# Patient Record
Sex: FEMALE | Race: OTHER
[De-identification: names, ages, dates, MRNs, and addresses within clinical notes are randomized per-mention and may not be internally consistent; named-entity substitution may affect disease eponyms.]

---

## 2020-10-29 ENCOUNTER — TRANSCRIPTION ENCOUNTER (OUTPATIENT)
Age: 50
End: 2020-10-29

## 2022-07-05 PROBLEM — Z00.00 ENCOUNTER FOR PREVENTIVE HEALTH EXAMINATION: Status: ACTIVE | Noted: 2022-07-05

## 2022-07-15 ENCOUNTER — APPOINTMENT (OUTPATIENT)
Dept: ORTHOPEDIC SURGERY | Facility: CLINIC | Age: 52
End: 2022-07-15

## 2022-07-15 VITALS — BODY MASS INDEX: 28.93 KG/M2 | HEIGHT: 66 IN | WEIGHT: 180 LBS

## 2022-07-15 DIAGNOSIS — S83.206A UNSPECIFIED TEAR OF UNSPECIFIED MENISCUS, CURRENT INJURY, RIGHT KNEE, INITIAL ENCOUNTER: ICD-10-CM

## 2022-07-15 PROCEDURE — 99214 OFFICE O/P EST MOD 30 MIN: CPT

## 2022-07-15 RX ORDER — MELOXICAM 15 MG/1
15 TABLET ORAL
Qty: 30 | Refills: 1 | Status: ACTIVE | COMMUNITY
Start: 2022-07-15 | End: 1900-01-01

## 2022-07-15 NOTE — PHYSICAL EXAM
[Normal Sensation] : normal sensation [Normal Mood and Affect] : normal mood and affect [Orientated] : orientated [Able to Communicate] : able to communicate [Well Developed] : well developed [Well Nourished] : well nourished [Right] : right knee [NL (140)] : flexion 140 degrees [NL (0)] : extension 0 degrees [5___] : hamstring 5[unfilled]/5 [] : negative Lachmann

## 2022-07-15 NOTE — HISTORY OF PRESENT ILLNESS
[5] : 5 [Tightness] : tightness [de-identified] : 7/14/22: f/u R knee, she has been doing ok, but complains mostly of tightness and pain at night, notes pain along the joint line mostly\par 2/3/22 Televisit for MRI review, symptoms gradually improving.\par 51F p/w R knee pain. She was on vacation in [place] when she twisted her knee and fell backwards. She had severe knee\par pain after and was unable to ambulate well afterwards. She complains of medial and lateral knee pain. She says the pain\par has been slowly resolving but has not gone away. [FreeTextEntry5] : Jacqueline is here today to F/U on her RT Knee.\par The Knee was getting better but then around June pain started to come back in the knee.\par States when she cracks the knee the pain subsides in it.\par Feels tightness in the knee.

## 2022-07-15 NOTE — ASSESSMENT
[FreeTextEntry1] : 51F p/w R knee MMT\par \par Begin meloxicam and PT\par return 6 weeks, discussed possible arthroscopy iof it fails to improve\par \par The patient was advised of the diagnosis. The natural history of the pathology was explained in full to the patient in layman's terms. All questions were answered. The risks and benefits of surgical and non-surgical treatment alternatives were explained in full to the patient. \par

## 2022-08-19 ENCOUNTER — APPOINTMENT (OUTPATIENT)
Dept: ORTHOPEDIC SURGERY | Facility: CLINIC | Age: 52
End: 2022-08-19